# Patient Record
Sex: FEMALE | Race: ASIAN | Employment: FULL TIME | URBAN - METROPOLITAN AREA
[De-identification: names, ages, dates, MRNs, and addresses within clinical notes are randomized per-mention and may not be internally consistent; named-entity substitution may affect disease eponyms.]

---

## 2017-02-24 ENCOUNTER — LAB ENCOUNTER (OUTPATIENT)
Dept: LAB | Age: 47
End: 2017-02-24
Attending: PHYSICIAN ASSISTANT
Payer: COMMERCIAL

## 2017-02-24 ENCOUNTER — OFFICE VISIT (OUTPATIENT)
Dept: FAMILY MEDICINE CLINIC | Facility: CLINIC | Age: 47
End: 2017-02-24

## 2017-02-24 VITALS
OXYGEN SATURATION: 98 % | HEART RATE: 72 BPM | DIASTOLIC BLOOD PRESSURE: 74 MMHG | HEIGHT: 60.5 IN | BODY MASS INDEX: 21.42 KG/M2 | TEMPERATURE: 99 F | SYSTOLIC BLOOD PRESSURE: 124 MMHG | RESPIRATION RATE: 16 BRPM | WEIGHT: 112 LBS

## 2017-02-24 DIAGNOSIS — Z78.9 HISTORY OF MEASLES, MUMPS, RUBELLA (MMR) VACCINATION UNKNOWN: ICD-10-CM

## 2017-02-24 DIAGNOSIS — Z23 NEED FOR TDAP VACCINATION: ICD-10-CM

## 2017-02-24 DIAGNOSIS — Z02.1 PHYSICAL EXAM, PRE-EMPLOYMENT: Primary | ICD-10-CM

## 2017-02-24 DIAGNOSIS — Z11.1 SCREENING FOR TUBERCULOSIS: ICD-10-CM

## 2017-02-24 LAB
INDURATION (): 0 MM (ref 0–11)
RUBV IGG SER QL: POSITIVE

## 2017-02-24 PROCEDURE — 86765 RUBEOLA ANTIBODY: CPT

## 2017-02-24 PROCEDURE — 86762 RUBELLA ANTIBODY: CPT

## 2017-02-24 PROCEDURE — 36415 COLL VENOUS BLD VENIPUNCTURE: CPT

## 2017-02-24 PROCEDURE — 86735 MUMPS ANTIBODY: CPT

## 2017-02-24 PROCEDURE — 99396 PREV VISIT EST AGE 40-64: CPT | Performed by: PHYSICIAN ASSISTANT

## 2017-02-24 PROCEDURE — 90715 TDAP VACCINE 7 YRS/> IM: CPT | Performed by: PHYSICIAN ASSISTANT

## 2017-02-24 PROCEDURE — 90471 IMMUNIZATION ADMIN: CPT | Performed by: PHYSICIAN ASSISTANT

## 2017-02-24 PROCEDURE — 86580 TB INTRADERMAL TEST: CPT | Performed by: PHYSICIAN ASSISTANT

## 2017-02-24 NOTE — PROGRESS NOTES
HPI:   Boone Gloden is a 51year old female who presents for a complete physical exam. Has form to be completed. Pt will be working at [employer]. Pt declines PAP smear and breast exam today. Declines routine blood work.      No concerns or compla anemia  ENDOCRINE: denies thyroid history  ALL/ASTHMA: denies hx of allergy or asthma    EXAM:   /74 mmHg  Pulse 72  Temp(Src) 98.7 °F (37.1 °C) (Temporal)  Resp 16  Ht 60.5\"  Wt 112 lb  BMI 21.51 kg/m2  SpO2 98%  Body mass index is 21.51 kg/(m^2).

## 2017-02-27 ENCOUNTER — NURSE ONLY (OUTPATIENT)
Dept: FAMILY MEDICINE CLINIC | Facility: CLINIC | Age: 47
End: 2017-02-27

## 2017-02-28 LAB
MEV IGG SER IA-ACNC: POSITIVE
MUV IGG SER IA-ACNC: POSITIVE

## 2017-06-07 ENCOUNTER — TELEPHONE (OUTPATIENT)
Dept: FAMILY MEDICINE CLINIC | Facility: CLINIC | Age: 47
End: 2017-06-07

## 2017-06-07 NOTE — TELEPHONE ENCOUNTER
Patient has been informed she will need appointment first.   She will schedule accordingly. Agreed to note.

## 2017-06-07 NOTE — TELEPHONE ENCOUNTER
Pt seen 2/24/17 for physical but declined breast exam and blood work. Now wants labs and mammo (last 2013).       AV, ok to order labs and mammogram?

## 2017-06-07 NOTE — TELEPHONE ENCOUNTER
Pt has px done feb 2017 with niesha/ pt is requesting an order be put in for bloodwork and for a mammogram/ please advise when done

## 2018-11-26 ENCOUNTER — OFFICE VISIT (OUTPATIENT)
Dept: FAMILY MEDICINE CLINIC | Facility: CLINIC | Age: 48
End: 2018-11-26
Payer: COMMERCIAL

## 2018-11-26 VITALS
DIASTOLIC BLOOD PRESSURE: 80 MMHG | HEART RATE: 73 BPM | OXYGEN SATURATION: 98 % | TEMPERATURE: 98 F | RESPIRATION RATE: 16 BRPM | HEIGHT: 60.5 IN | BODY MASS INDEX: 21.04 KG/M2 | SYSTOLIC BLOOD PRESSURE: 108 MMHG | WEIGHT: 110 LBS

## 2018-11-26 DIAGNOSIS — R49.0 HOARSENESS OF VOICE: Primary | ICD-10-CM

## 2018-11-26 PROCEDURE — 99213 OFFICE O/P EST LOW 20 MIN: CPT | Performed by: INTERNAL MEDICINE

## 2018-11-26 RX ORDER — MONTELUKAST SODIUM 10 MG/1
10 TABLET ORAL DAILY
Qty: 30 TABLET | Refills: 0 | Status: SHIPPED | OUTPATIENT
Start: 2018-11-26 | End: 2019-05-18 | Stop reason: ALTCHOICE

## 2018-11-27 NOTE — PROGRESS NOTES
Radha Mckeon is a 51year old female. HPI:   Here with hoarse voice x 5 weeks. Occurred without other symptoms. Went thru 1 1/2 bottles of Dayquil and Nyquil with no relief. Rare dry cough.     Current Outpatient Medications:  Montelukast Sodium (SINGUL Montelukast Sodium (SINGULAIR) 10 MG Oral Tab      The patient indicates understanding of these issues and agrees to the plan. Follow up 1 month.

## 2019-05-18 ENCOUNTER — OFFICE VISIT (OUTPATIENT)
Dept: FAMILY MEDICINE CLINIC | Facility: CLINIC | Age: 49
End: 2019-05-18
Payer: COMMERCIAL

## 2019-05-18 VITALS
RESPIRATION RATE: 16 BRPM | HEIGHT: 59 IN | HEART RATE: 80 BPM | OXYGEN SATURATION: 98 % | WEIGHT: 103 LBS | DIASTOLIC BLOOD PRESSURE: 70 MMHG | SYSTOLIC BLOOD PRESSURE: 100 MMHG | BODY MASS INDEX: 20.76 KG/M2 | TEMPERATURE: 99 F

## 2019-05-18 DIAGNOSIS — K64.4 EXTERNAL HEMORRHOID: Primary | ICD-10-CM

## 2019-05-18 PROCEDURE — 99213 OFFICE O/P EST LOW 20 MIN: CPT | Performed by: INTERNAL MEDICINE

## 2019-05-18 NOTE — PROGRESS NOTES
Terri Sanders is a 47year old female. HPI:   Here with a lump near the rectum for a few days. No pain but \"uneasy\" feeling. No blood in stool. No constipation or diarrhea.       Current Outpatient Medications:  hydrocortisone (PROCTO-MED HC) 2.5 % Rect mammogram    No orders of the defined types were placed in this encounter. None  Orders Placed This Encounter      hydrocortisone (PROCTO-MED HC) 2.5 % Rectal Cream      The patient indicates understanding of these issues and agrees to the plan.   Follow

## 2020-02-17 ENCOUNTER — HOSPITAL ENCOUNTER (OUTPATIENT)
Dept: MAMMOGRAPHY | Age: 50
Discharge: HOME | End: 2020-02-17
Attending: INTERNAL MEDICINE
Payer: COMMERCIAL

## 2020-02-17 ENCOUNTER — OFFICE VISIT (OUTPATIENT)
Dept: FAMILY MEDICINE CLINIC | Facility: CLINIC | Age: 50
End: 2020-02-17
Payer: COMMERCIAL

## 2020-02-17 VITALS
SYSTOLIC BLOOD PRESSURE: 112 MMHG | TEMPERATURE: 97 F | HEIGHT: 59 IN | DIASTOLIC BLOOD PRESSURE: 80 MMHG | WEIGHT: 112 LBS | RESPIRATION RATE: 18 BRPM | HEART RATE: 82 BPM | OXYGEN SATURATION: 98 % | BODY MASS INDEX: 22.58 KG/M2

## 2020-02-17 DIAGNOSIS — Z12.11 ENCOUNTER FOR SCREENING COLONOSCOPY: ICD-10-CM

## 2020-02-17 DIAGNOSIS — Z12.31 ENCOUNTER FOR SCREENING MAMMOGRAM FOR BREAST CANCER: ICD-10-CM

## 2020-02-17 DIAGNOSIS — Z00.00 ROUTINE GENERAL MEDICAL EXAMINATION AT A HEALTH CARE FACILITY: Primary | ICD-10-CM

## 2020-02-17 DIAGNOSIS — M79.674 TOE PAIN, BILATERAL: ICD-10-CM

## 2020-02-17 DIAGNOSIS — Z12.4 SCREENING FOR CERVICAL CANCER: ICD-10-CM

## 2020-02-17 DIAGNOSIS — M79.675 TOE PAIN, BILATERAL: ICD-10-CM

## 2020-02-17 PROCEDURE — 77067 SCR MAMMO BI INCL CAD: CPT | Performed by: INTERNAL MEDICINE

## 2020-02-17 PROCEDURE — 99396 PREV VISIT EST AGE 40-64: CPT | Performed by: INTERNAL MEDICINE

## 2020-02-17 PROCEDURE — 77063 BREAST TOMOSYNTHESIS BI: CPT | Performed by: INTERNAL MEDICINE

## 2020-02-17 RX ORDER — OMEPRAZOLE 20 MG/1
TABLET, DELAYED RELEASE ORAL
COMMUNITY
Start: 2018-12-08 | End: 2020-02-17

## 2020-02-17 RX ORDER — MONTELUKAST SODIUM 10 MG/1
TABLET ORAL
COMMUNITY
Start: 2018-12-08 | End: 2020-02-17

## 2020-02-17 RX ORDER — FLUTICASONE PROPIONATE 50 MCG
SPRAY, SUSPENSION (ML) NASAL
COMMUNITY
End: 2020-02-17

## 2020-02-17 NOTE — PROGRESS NOTES
HPI:   Marah Correa is a 47year old female who presents for a well woman exam. Symptoms: denies discharge, itching, burning or dysuria, periods are regular. Patient's last menstrual period was 01/23/2020 (lmp unknown).   Previous pap: 2014 normal  Perf °C) (Other)   Resp 18   Ht 59\"   Wt 112 lb (50.8 kg)   LMP 01/23/2020 (LMP Unknown)   SpO2 98%   BMI 22.62 kg/m²       Body mass index is 22.62 kg/m².       GENERAL: well developed, well nourished,in no apparent distress  SKIN: no rashes,no suspicious lesi HPV MRNA E6/E7 [58279] [Q]      .

## 2020-02-19 LAB — HPV MRNA E6/E7: NOT DETECTED

## 2020-02-24 LAB
ABSOLUTE BASOPHILS: 60 CELLS/UL (ref 0–200)
ABSOLUTE EOSINOPHILS: 140 CELLS/UL (ref 15–500)
ABSOLUTE LYMPHOCYTES: 1720 CELLS/UL (ref 850–3900)
ABSOLUTE MONOCYTES: 492 CELLS/UL (ref 200–950)
ABSOLUTE NEUTROPHILS: 1588 CELLS/UL (ref 1500–7800)
ALBUMIN/GLOBULIN RATIO: 1.5 (CALC) (ref 1–2.5)
ALBUMIN: 4.1 G/DL (ref 3.6–5.1)
ALKALINE PHOSPHATASE: 53 U/L (ref 37–153)
ALT: 10 U/L (ref 6–29)
AST: 16 U/L (ref 10–35)
BASOPHILS: 1.5 %
BILIRUBIN, TOTAL: 0.7 MG/DL (ref 0.2–1.2)
BUN: 11 MG/DL (ref 7–25)
CALCIUM: 9.6 MG/DL (ref 8.6–10.4)
CARBON DIOXIDE: 25 MMOL/L (ref 20–32)
CHLORIDE: 105 MMOL/L (ref 98–110)
CHOL/HDLC RATIO: 2.4 (CALC)
CHOLESTEROL, TOTAL: 220 MG/DL
CREATININE: 0.94 MG/DL (ref 0.5–1.05)
EGFR IF AFRICN AM: 82 ML/MIN/1.73M2
EGFR IF NONAFRICN AM: 71 ML/MIN/1.73M2
EOSINOPHILS: 3.5 %
GLOBULIN: 2.8 G/DL (CALC) (ref 1.9–3.7)
GLUCOSE: 92 MG/DL (ref 65–99)
HDL CHOLESTEROL: 91 MG/DL
HEMATOCRIT: 41.3 % (ref 35–45)
HEMOGLOBIN: 14 G/DL (ref 11.7–15.5)
LDL-CHOLESTEROL: 114 MG/DL (CALC)
LYMPHOCYTES: 43 %
MCH: 29.4 PG (ref 27–33)
MCHC: 33.9 G/DL (ref 32–36)
MCV: 86.8 FL (ref 80–100)
MONOCYTES: 12.3 %
MPV: 10.4 FL (ref 7.5–12.5)
NEUTROPHILS: 39.7 %
NON-HDL CHOLESTEROL: 129 MG/DL (CALC)
PLATELET COUNT: 327 THOUSAND/UL (ref 140–400)
POTASSIUM: 4.7 MMOL/L (ref 3.5–5.3)
PROTEIN, TOTAL: 6.9 G/DL (ref 6.1–8.1)
RDW: 12.9 % (ref 11–15)
RED BLOOD CELL COUNT: 4.76 MILLION/UL (ref 3.8–5.1)
SODIUM: 137 MMOL/L (ref 135–146)
TRIGLYCERIDES: 66 MG/DL
TSH W/REFLEX TO FT4: 1.59 MIU/L
VITAMIN B12: 962 PG/ML (ref 200–1100)
VITAMIN D, 25-OH, TOTAL: 44 NG/ML (ref 30–100)
WHITE BLOOD CELL COUNT: 4 THOUSAND/UL (ref 3.8–10.8)

## 2020-05-12 ENCOUNTER — VIRTUAL PHONE E/M (OUTPATIENT)
Dept: FAMILY MEDICINE CLINIC | Facility: CLINIC | Age: 50
End: 2020-05-12
Payer: COMMERCIAL

## 2020-05-12 DIAGNOSIS — M25.522 LEFT ELBOW PAIN: Primary | ICD-10-CM

## 2020-05-12 PROCEDURE — 99213 OFFICE O/P EST LOW 20 MIN: CPT | Performed by: INTERNAL MEDICINE

## 2020-05-12 NOTE — PROGRESS NOTES
Virtual Telephone Check-In    Norman Lainez verbally consents to a Virtual/Telephone Check-In visit on 05/12/20. Patient understands and accepts financial responsibility for any deductible, co-insurance and/or co-pays associated with this service.     Du FNP

## 2021-04-15 ENCOUNTER — PATIENT MESSAGE (OUTPATIENT)
Dept: FAMILY MEDICINE CLINIC | Facility: CLINIC | Age: 51
End: 2021-04-15

## 2021-04-16 NOTE — TELEPHONE ENCOUNTER
Please review previous message and advise. Will you complete form for pt? Last ov 5/2020 for elbow pain  Last physical 2/17/2020  I have placed copy of form and titres in [facility] office.   Also last TB test 2/24/2017

## 2021-05-03 ENCOUNTER — TELEPHONE (OUTPATIENT)
Dept: FAMILY MEDICINE CLINIC | Facility: CLINIC | Age: 51
End: 2021-05-03

## 2021-05-03 NOTE — TELEPHONE ENCOUNTER
Pt seen 2/2020 for px,  Will you complete form using this date?  Form left in [facility] office to review

## 2021-05-03 NOTE — TELEPHONE ENCOUNTER
NEEDS A FORM FILLED OUT FOR WORK.  DROPPED OFF. I INFORMED HIM THAT IT WOULD HAVE A 2020 DATE ON IT.  HE SAID THAT WAS FINE.     FORM IS IN CLIFF TRIAGE BIN    PT WILL PICK IT UP

## (undated) NOTE — MR AVS SNAPSHOT
[address]  [address] [facility], [address] [address] [phone]               Thank you for choosing us for your health care visit with [facility] NURSE.   We are glad to serve you and happy to provide you with this summar

## (undated) NOTE — MR AVS SNAPSHOT
[address]  [address] [id number] [phone]               Thank you for choosing us for your health care visit with Robinson Walker, [address].   We are glad to serve you and happy to provide you with this office, you can view your past visit information in [facility] by going to Visits < Visit Summaries. [facility] questions? Call [phone] for help. [facility] is NOT to be used for urgent needs. For medical emergencies, dial 911.            Visit [facility]